# Patient Record
(demographics unavailable — no encounter records)

---

## 2025-03-03 NOTE — PHYSICAL EXAM
[FreeTextEntry1] : General: Patient is awake and alert and in no acute distress . oriented to person, place. well developed, well nourished, cooperative.   Skin: The skin is intact, warm, pink, and dry over the area examined.    Eyes: normal conjunctiva, normal eyelids and pupils were equal and round.   ENT: normal ears, normal nose and normal lips.  Cardiovascular: There is brisk capillary refill in the digits of the affected extremity. They are symmetric pulses in the bilateral upper and lower extremities, positive peripheral pulses, brisk capillary refill, but no peripheral edema.  Respiratory: The patient is in no apparent respiratory distress. They're taking full deep breaths without use of accessory muscles or evidence of audible wheezes or stridor without the use of a stethoscope, normal respiratory effort.   Neurological: 5/5 motor strength in the main muscle groups of bilateral lower extremities, sensory intact in bilateral lower extremities.   Musculoskeletal:  Examination of bilateral lower extremities reveals wide symmetric abduction of bilateral hips to greater than 60. Prone internal rotation to 70, prone external rotation to 50. Thigh foot angle is -10 bilaterally.  Bilateral knees with full range of motion. Both knees are clinically stable. Negative Galeazzi.  Bilateral ankle dorsiflexion to +20 with knees extended. Mild flexible flatfoot deformity. With standing, arches collapse and heels tip into valgus. This is flexible and easily correctable with toe dorsiflexion. Subtalar motion is full and free.  Child is ambulating independently with intoeing gait. No falls observed.  Spine appears grossly midline without midline spine defects. No dirk of hair. No dimple, no sinus.

## 2025-03-03 NOTE — END OF VISIT
[FreeTextEntry3] : I, Ricki Jama MD, I personally performed the services described in the documentation, reviewed the documentation recorded by the scribe in my presence and it accurately and completely records my words and actions

## 2025-03-03 NOTE — REASON FOR VISIT
[Initial Evaluation] : an initial evaluation [Parents] : parents [Mother] : mother [FreeTextEntry1] : Intoeing

## 2025-03-03 NOTE — ASSESSMENT
[FreeTextEntry1] : ABEL is a 2-year-old F with internal tibial torsion.   Today's visit included obtaining history from the child parent due to the child's age, the child could not be considered a reliable historian, requiring parent to act as independent historian. Long discussion was done with family regarding diagnosis, treatment options and prognosis.  Clinical exam reviewed with parents at length. Natural history of internal tibial torsion discussed at length. Lower extremity alignment should improve as child ages. Parents should notice significant improvement in intoeing by age 6-8. Range of lower normal extremity alignment has been discussed. Frequent falls at this age are common. Lorena balance and coordination will increase with age. Child may continue to participate in activities as tolerated. I would like to see her back in 12 months for clinical reevaluation, they may return sooner if they have any other concerns. All questions and concerns were addressed today. Parents verbalize understanding and agree with plan of care.  This plan was discussed with family. Family verbalizes understanding and agreement of plan. All questions and concerns were addressed today.  I, Benjamin Serrano, have acted as a scribe and documented the above for Dr. Jama.

## 2025-03-03 NOTE — REVIEW OF SYSTEMS
[Appropriate Age Development] : development appropriate for age [Change in Activity] : no change in activity [Fever Above 102] : no fever [Rash] : no rash [Itching] : no itching [Eye Pain] : no eye pain [Redness] : no redness [Tachypnea] : no tachypnea [Cough] : no cough [Limping] : no limping [Joint Pains] : no arthralgias [Joint Swelling] : no joint swelling [Muscle Aches] : no muscle aches

## 2025-03-03 NOTE — HISTORY OF PRESENT ILLNESS
[FreeTextEntry1] : 1 y/o F, with no significant PMHx, brought in by her parents for evaluation for in-toeing gait. The patient's parents would notice that she would walk with both feet turned inwards; the patient would also frequently trip. She first started walking at 15 months and has not complained of any pain and has not ever refused to bear any weight. No recent illness, no nausea/vomiting, no fevers/chills. She has not needed any pain medications. The patient is otherwise a healthy, active infant.